# Patient Record
Sex: MALE | Race: BLACK OR AFRICAN AMERICAN | NOT HISPANIC OR LATINO | Employment: FULL TIME | URBAN - METROPOLITAN AREA
[De-identification: names, ages, dates, MRNs, and addresses within clinical notes are randomized per-mention and may not be internally consistent; named-entity substitution may affect disease eponyms.]

---
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
## 2022-02-20 ENCOUNTER — OFFICE VISIT (OUTPATIENT)
Dept: URGENT CARE | Facility: CLINIC | Age: 22
End: 2022-02-20
Payer: MEDICAID

## 2022-02-20 VITALS
OXYGEN SATURATION: 98 % | DIASTOLIC BLOOD PRESSURE: 90 MMHG | RESPIRATION RATE: 18 BRPM | TEMPERATURE: 98 F | HEART RATE: 82 BPM | SYSTOLIC BLOOD PRESSURE: 142 MMHG

## 2022-02-20 DIAGNOSIS — L02.419 ARMPIT ABSCESS: Primary | ICD-10-CM

## 2022-02-20 PROCEDURE — 99213 OFFICE O/P EST LOW 20 MIN: CPT | Mod: 25,S$GLB,,

## 2022-02-20 PROCEDURE — 3077F PR MOST RECENT SYSTOLIC BLOOD PRESSURE >= 140 MM HG: ICD-10-PCS | Mod: CPTII,S$GLB,,

## 2022-02-20 PROCEDURE — 10060 I&D ABSCESS SIMPLE/SINGLE: CPT | Mod: S$GLB,,,

## 2022-02-20 PROCEDURE — 10060 INCISION & DRAINAGE: ICD-10-PCS | Mod: S$GLB,,,

## 2022-02-20 PROCEDURE — 1160F PR REVIEW ALL MEDS BY PRESCRIBER/CLIN PHARMACIST DOCUMENTED: ICD-10-PCS | Mod: CPTII,S$GLB,,

## 2022-02-20 PROCEDURE — 1159F MED LIST DOCD IN RCRD: CPT | Mod: CPTII,S$GLB,,

## 2022-02-20 PROCEDURE — 3077F SYST BP >= 140 MM HG: CPT | Mod: CPTII,S$GLB,,

## 2022-02-20 PROCEDURE — 99213 PR OFFICE/OUTPT VISIT, EST, LEVL III, 20-29 MIN: ICD-10-PCS | Mod: 25,S$GLB,,

## 2022-02-20 PROCEDURE — 3080F PR MOST RECENT DIASTOLIC BLOOD PRESSURE >= 90 MM HG: ICD-10-PCS | Mod: CPTII,S$GLB,,

## 2022-02-20 PROCEDURE — 1159F PR MEDICATION LIST DOCUMENTED IN MEDICAL RECORD: ICD-10-PCS | Mod: CPTII,S$GLB,,

## 2022-02-20 PROCEDURE — 3080F DIAST BP >= 90 MM HG: CPT | Mod: CPTII,S$GLB,,

## 2022-02-20 PROCEDURE — 1160F RVW MEDS BY RX/DR IN RCRD: CPT | Mod: CPTII,S$GLB,,

## 2022-02-20 RX ORDER — DOXYCYCLINE HYCLATE 100 MG
100 TABLET ORAL 2 TIMES DAILY
Qty: 14 TABLET | Refills: 0 | Status: SHIPPED | OUTPATIENT
Start: 2022-02-20 | End: 2022-02-27

## 2022-02-20 RX ORDER — MUPIROCIN 20 MG/G
OINTMENT TOPICAL 2 TIMES DAILY
Qty: 15 G | Refills: 0 | Status: SHIPPED | OUTPATIENT
Start: 2022-02-20 | End: 2022-02-27

## 2022-02-20 NOTE — PROGRESS NOTES
Subjective:       Patient ID: Betty Mireles is a 21 y.o. male.    Vitals:  oral temperature is 97.9 °F (36.6 °C). His blood pressure is 142/90 (abnormal) and his pulse is 82. His respiration is 18 and oxygen saturation is 98%.     Chief Complaint: Abscess    22 yo male presents to urgent care for evaluation. Patient c/o abscesses in both armpits that appeared 2-3 days ago. States the abscess in his left armpit is painful and red. No drainage or fever. Denies a history of abscesses prior to now.     Abscess  Chronicity:  NewProgression Since Onset: worsening  Abscess location: armpit.  Associated Symptoms: no fever  Characteristics: painful    Characteristics: not draining        Constitution: Negative for fever.   Skin: Positive for abscess. Negative for erythema.       Objective:      Physical Exam   Constitutional: He is oriented to person, place, and time. He appears well-developed.   HENT:   Head: Normocephalic and atraumatic. Head is without abrasion, without contusion and without laceration.   Ears:   Right Ear: External ear normal.   Left Ear: External ear normal.   Nose: Nose normal.   Mouth/Throat: Oropharynx is clear and moist and mucous membranes are normal.   Eyes: Conjunctivae, EOM and lids are normal. Pupils are equal, round, and reactive to light.   Neck: Trachea normal and phonation normal. Neck supple.   Cardiovascular: Normal rate, regular rhythm and normal heart sounds.   Pulmonary/Chest: Effort normal and breath sounds normal. No stridor. No respiratory distress.   Musculoskeletal: Normal range of motion.         General: Normal range of motion.   Neurological: He is alert and oriented to person, place, and time.   Skin: Skin is warm, dry, intact and no rash. Capillary refill takes less than 2 seconds. No abrasion, No burn, No bruising, No erythema and No ecchymosis         Comments: Estimated 2 cm area of loculation in the left axillae with small amount of surrounding erythema. No drainage     Psychiatric: His speech is normal and behavior is normal. Judgment and thought content normal.   Nursing note and vitals reviewed.        Assessment:       1. Armpit abscess          Plan:       Patient well appearing, stable vitals. Discussed side effects and administration of prescription and over-the-counter treatment for symptom control as discussed below. Discharge instructions discussed in length as below. All questions answered. Patient agreed to this treatment plan.  Patient discharged in stable condition.      Armpit abscess  -     doxycycline (VIBRA-TABS) 100 MG tablet; Take 1 tablet (100 mg total) by mouth 2 (two) times daily. for 7 days  Dispense: 14 tablet; Refill: 0  -     mupirocin (BACTROBAN) 2 % ointment; Apply topically 2 (two) times daily. for 7 days  Dispense: 15 g; Refill: 0  -     Ambulatory referral/consult to Dermatology  -     Incision & Drainage      Patient Instructions                                              Abscess/Cellulitis   If your condition worsens or fails to improve we recommend that you receive another evaluation at the ER immediately or contact your PCP to discuss your concerns or return here. You must understand that you've received an urgent care treatment only and that you may be released before all your medical problems are known or treated. You the patient will arrange for follow-up care as instructed.     Incision and Drainage Directions: If your abcess was drained today and packed, return to clinic to remove packing in 2 days. If packing falls out,  soak wound in warm water with epsom salts and apply warm compresses. You can wash your wound with soap and water.    Soak affected in warm water with epsom salts several times a day. Dilute 2 cups per gallon of water. If you cannot soak, apply warm compresses to the affected area for 20 min, 3-5 times per day and apply warm compresses frequently. If you cannot soak, use the shower head.   Avoid picking or manipulating  the wound.     Clean the wound twice a day and put the antibiotic ointment on it if prescribed.     If you were prescribed antibiotics, please take them to completion. If you are female and on BCP use additional methods to prevent pregnancy while on antibiotics and for one cycle after.     If you were given narcotics do not drive or operate heavy equipment or machinery while taking these medications.     Tylenol or ibuprofen can also be used as directed for pain unless you have an allergy to them or medical condition such as stomach ulcers, kidney or liver disease or blood thinners etc for which you should not be taking these type of medications.     Return in 48- 72 hours for recheck.     You should seek ER treatment if fever, increase pain, swelling or other signs of increasing infection.     Please follow up with your primary care doctor or specialist as needed.

## 2022-02-20 NOTE — PATIENT INSTRUCTIONS
Abscess/Cellulitis   If your condition worsens or fails to improve we recommend that you receive another evaluation at the ER immediately or contact your PCP to discuss your concerns or return here. You must understand that you've received an urgent care treatment only and that you may be released before all your medical problems are known or treated. You the patient will arrange for follow-up care as instructed.     Incision and Drainage Directions: If your abcess was drained today and packed, return to clinic to remove packing in 2 days. If packing falls out,  soak wound in warm water with epsom salts and apply warm compresses. You can wash your wound with soap and water.    Soak affected in warm water with epsom salts several times a day. Dilute 2 cups per gallon of water. If you cannot soak, apply warm compresses to the affected area for 20 min, 3-5 times per day and apply warm compresses frequently. If you cannot soak, use the shower head.   Avoid picking or manipulating the wound.     Clean the wound twice a day and put the antibiotic ointment on it if prescribed.     If you were prescribed antibiotics, please take them to completion. If you are female and on BCP use additional methods to prevent pregnancy while on antibiotics and for one cycle after.     If you were given narcotics do not drive or operate heavy equipment or machinery while taking these medications.     Tylenol or ibuprofen can also be used as directed for pain unless you have an allergy to them or medical condition such as stomach ulcers, kidney or liver disease or blood thinners etc for which you should not be taking these type of medications.     Return in 48- 72 hours for recheck.     You should seek ER treatment if fever, increase pain, swelling or other signs of increasing infection.     Please follow up with your primary care doctor or specialist as needed.

## 2022-02-22 NOTE — PROCEDURES
Incision & Drainage    Date/Time: 2/20/2022 2:45 PM  Performed by: Tika Caballero PA-C  Authorized by: Tika Caballero PA-C       Type:  Abscess  Body area:  Trunk (left axilla)  Anesthesia:  Local infiltration  Local anesthetic: lidocaine 1% with epinephrine  Anesthetic total (ml):  2  Scalpel size: 18 g needle and 11 blade.  Incision type:  Single straight  Drainage:  Purulent and bloody  Drainage amount:  Scant  Wound treatment:  Expression of material  Packing material:  None  Patient tolerance:  Patient tolerated the procedure well with no immediate complications